# Patient Record
Sex: MALE | Race: WHITE | NOT HISPANIC OR LATINO | Employment: OTHER | URBAN - METROPOLITAN AREA
[De-identification: names, ages, dates, MRNs, and addresses within clinical notes are randomized per-mention and may not be internally consistent; named-entity substitution may affect disease eponyms.]

---

## 2017-03-24 ENCOUNTER — ALLSCRIPTS OFFICE VISIT (OUTPATIENT)
Dept: OTHER | Facility: OTHER | Age: 42
End: 2017-03-24

## 2017-03-24 ENCOUNTER — APPOINTMENT (OUTPATIENT)
Dept: LAB | Facility: HOSPITAL | Age: 42
End: 2017-03-24
Payer: COMMERCIAL

## 2017-03-24 DIAGNOSIS — R21 RASH AND OTHER NONSPECIFIC SKIN ERUPTION: ICD-10-CM

## 2017-03-24 PROCEDURE — 87186 SC STD MICRODIL/AGAR DIL: CPT

## 2017-03-24 PROCEDURE — 87070 CULTURE OTHR SPECIMN AEROBIC: CPT

## 2017-03-24 PROCEDURE — 87255 GENET VIRUS ISOLATE HSV: CPT

## 2017-03-24 PROCEDURE — 87147 CULTURE TYPE IMMUNOLOGIC: CPT

## 2017-03-27 LAB
BACTERIA NOSE AEROBE CULT: ABNORMAL
BACTERIA NOSE AEROBE CULT: ABNORMAL

## 2017-03-30 LAB — HSV SPEC CULT: NORMAL

## 2017-05-15 ENCOUNTER — ALLSCRIPTS OFFICE VISIT (OUTPATIENT)
Dept: OTHER | Facility: OTHER | Age: 42
End: 2017-05-15

## 2017-08-16 ENCOUNTER — ALLSCRIPTS OFFICE VISIT (OUTPATIENT)
Dept: OTHER | Facility: OTHER | Age: 42
End: 2017-08-16

## 2018-01-12 VITALS
SYSTOLIC BLOOD PRESSURE: 136 MMHG | WEIGHT: 177.5 LBS | BODY MASS INDEX: 26.9 KG/M2 | HEART RATE: 74 BPM | DIASTOLIC BLOOD PRESSURE: 74 MMHG | HEIGHT: 68 IN

## 2018-01-14 VITALS
RESPIRATION RATE: 14 BRPM | BODY MASS INDEX: 26.99 KG/M2 | HEIGHT: 69 IN | WEIGHT: 182.25 LBS | SYSTOLIC BLOOD PRESSURE: 130 MMHG | DIASTOLIC BLOOD PRESSURE: 80 MMHG | HEART RATE: 68 BPM

## 2018-01-14 VITALS
HEIGHT: 69 IN | DIASTOLIC BLOOD PRESSURE: 76 MMHG | SYSTOLIC BLOOD PRESSURE: 128 MMHG | BODY MASS INDEX: 26.22 KG/M2 | WEIGHT: 177 LBS | HEART RATE: 68 BPM | RESPIRATION RATE: 14 BRPM

## 2018-07-23 ENCOUNTER — HOSPITAL ENCOUNTER (EMERGENCY)
Facility: HOSPITAL | Age: 43
Discharge: HOME | End: 2018-07-23
Attending: EMERGENCY MEDICINE
Payer: COMMERCIAL

## 2018-07-23 VITALS
DIASTOLIC BLOOD PRESSURE: 73 MMHG | RESPIRATION RATE: 18 BRPM | SYSTOLIC BLOOD PRESSURE: 106 MMHG | HEART RATE: 86 BPM | TEMPERATURE: 98.2 F | OXYGEN SATURATION: 98 %
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
## 2018-07-23 DIAGNOSIS — S60.511A: Primary | ICD-10-CM

## 2018-07-23 PROCEDURE — 99283 EMERGENCY DEPT VISIT LOW MDM: CPT

## 2018-07-23 RX ORDER — IBUPROFEN 400 MG/1
400 TABLET ORAL ONCE
Status: COMPLETED | OUTPATIENT
Start: 2018-07-23 | End: 2018-07-23

## 2018-07-23 RX ORDER — AMOXICILLIN AND CLAVULANATE POTASSIUM 875; 125 MG/1; MG/1
1 TABLET, FILM COATED ORAL ONCE
Status: COMPLETED | OUTPATIENT
Start: 2018-07-23 | End: 2018-07-23

## 2018-07-23 RX ORDER — BACITRACIN, NEOMYCIN, POLYMYXIN B 400; 3.5; 5 [USP'U]/G; MG/G; [USP'U]/G
1 OINTMENT TOPICAL ONCE
Status: COMPLETED | OUTPATIENT
Start: 2018-07-23 | End: 2018-07-23

## 2018-07-23 RX ORDER — AMOXICILLIN AND CLAVULANATE POTASSIUM 875; 125 MG/1; MG/1
1 TABLET, FILM COATED ORAL EVERY 12 HOURS
Qty: 14 TABLET | Refills: 0 | Status: SHIPPED | OUTPATIENT
Start: 2018-07-23 | End: 2018-07-30

## 2018-07-23 RX ADMIN — AMOXICILLIN AND CLAVULANATE POTASSIUM 1 TABLET: 875; 125 TABLET, FILM COATED ORAL at 12:44

## 2018-07-23 RX ADMIN — IBUPROFEN 400 MG: 400 TABLET ORAL at 12:44

## 2018-07-23 RX ADMIN — BACITRACIN, NEOMYCIN, POLYMYXIN B 1 SMALL APPLICATION: 400; 3.5; 5 OINTMENT TOPICAL at 12:45

## 2018-07-23 NOTE — DISCHARGE INSTRUCTIONS
Take Augmentin as prescribed to prevent infection in the hand  Cleanse the deep abrasion / scratch once to twice daily with mild soap and water  Apply an over-the-counter antibacterial ointment such as Neosporin, triple antibiotic or bacitracin and cover with bandage to prevent contamination with debris  Re-seek medical attention if you developed redness outside of the area, swelling or any other worsening  Animal Bite   WHAT YOU NEED TO KNOW:   Animal bite injuries range from shallow cuts to deep, life-threatening wounds  An animal can cut or puncture the skin when it bites  Your skin may be torn from your body  Your skin may swell or bruise even if the bite does not break the skin  Animal bites occur more often on the hands, arms, legs, and face  Bites from dogs and cats are the most common injuries  DISCHARGE INSTRUCTIONS:   Seek care immediately if:   · You have a fever  · Your wound is red, swollen, and draining pus  · You see red streaks on the skin around the wound  · You can no longer move the bitten area  · Your heartbeat and breathing are much faster than usual     · You feel dizzy and confused  Contact your healthcare provider if:   · Your pain does not get better, even after you take pain medicine  · You have nightmares or flashbacks about the animal bite  · You have questions or concerns about your condition or care  Medicines: You may need any of the following:  · Antibiotics  prevent or treat a bacterial infection  · Prescription pain medicine  may be given  Ask how to take this medicine safely  · A tetanus vaccine  may be needed to prevent tetanus  Tetanus is a life-threatening bacterial infection that affects the nerves and muscles  The bacteria can be spread through animal bites  · A rabies vaccine  may be needed to prevent rabies  Rabies is a life-threatening viral infection  The virus can be spread through animal bites      · Take your medicine as directed  Contact your healthcare provider if you think your medicine is not helping or if you have side effects  Tell him or her if you are allergic to any medicine  Keep a list of the medicines, vitamins, and herbs you take  Include the amounts, and when and why you take them  Bring the list or the pill bottles to follow-up visits  Carry your medicine list with you in case of an emergency  Follow up with your healthcare provider in 1 to 2 days: You may need to return to have your stitches removed  Write down your questions so you remember to ask them during your visits  Self-care:   · Apply antibiotic ointment as directed  This helps prevent infection in minor skin wounds  It is available without a doctor's order  · Keep the wound clean and covered  Wash the wound every day with soap and water or germ-killing cleanser  Ask your healthcare provider about the kinds of bandages to use  · Apply ice on your wound  Ice helps decrease swelling and pain  Ice may also help prevent tissue damage  Use an ice pack, or put crushed ice in a plastic bag  Cover it with a towel and place it on your wound for 15 to 20 minutes every hour or as directed  · Elevate the wound area  Raise your wound above the level of your heart as often as you can  This will help decrease swelling and pain  Prop your wound on pillows or blankets to keep it elevated comfortably  Prevent another animal bite:   · Learn to recognize the signs of a scared or angry pet  Avoid quick, sudden movements  · Do not step between animals that are fighting  · Do not leave a pet alone with a young child  · Do not disturb an animal while it eats, sleeps, or cares for its young  · Do not approach an animal you do not know, especially one that is tied up or caged  · Stay away from animals that seem sick or act strangely  · Do not feed or capture wild animals    © 2017 [employer] Information is for End User's use only and may not be sold, redistributed or otherwise used for commercial purposes  All illustrations and images included in CareNotes® are the copyrighted property of [employer]  or Christian Hammond  The above information is an  only  It is not intended as medical advice for individual conditions or treatments  Talk to your doctor, nurse or pharmacist before following any medical regimen to see if it is safe and effective for you  Cat Scratch Disease   WHAT YOU NEED TO KNOW:   Cat-scratch disease (CSD) is an infection caused by bacteria in a cat's mouth  You can get CSD by being scratched, licked, or bitten by an infected cat  The germs usually spread after the cat licks its paws then scratches or bites human skin  DISCHARGE INSTRUCTIONS:   Seek care immediately if:   · You have severe pain in your stomach, muscles, bones, or joints  · You have severe pain in the lymph nodes in your neck, armpit, or groin  · You have seizures, headaches, or cannot think clearly  Contact your healthcare provider if:   · You have a fever, sore throat, or headache  · You notice swelling in your neck, armpit, or groin  · You have stomach, muscle, or joint pain  · You have a skin rash, itching, or swelling after you take your medicine  · You have questions or concerns about your condition or care  Medicines:   · Medicines  may be given to help treat a bacterial infection or decrease pain, fever, and swelling  · Take your medicine as directed  Contact your healthcare provider if you think your medicine is not helping or if you have side effects  Tell him or her if you are allergic to any medicine  Keep a list of the medicines, vitamins, and herbs you take  Include the amounts, and when and why you take them  Bring the list or the pill bottles to follow-up visits  Carry your medicine list with you in case of an emergency    Follow up with your healthcare provider as directed:  Write down your questions so you remember to ask them during your visits  Prevent CSD:   · Wash your hands after you handle or pet a cat  · Have your cat treated for fleas  Aggie Song can spread the germ from cat to cat  · Do not allow your cat to lick an open wound on your skin  · Take care when you play with cats to avoid bites or scratches  Avoid rough play  · If you get scratched, licked, or bitten by a cat, wash the affected area with clean water and soap right away  © 2017 [facility] Information is for End User's use only and may not be sold, redistributed or otherwise used for commercial purposes  All illustrations and images included in CareNotes® are the copyrighted property of [employer]  or Christian Hammond  The above information is an  only  It is not intended as medical advice for individual conditions or treatments  Talk to your doctor, nurse or pharmacist before following any medical regimen to see if it is safe and effective for you

## 2018-07-26 NOTE — ED PROVIDER NOTES
History  Chief Complaint   Patient presents with    Cat Scratch     Pt got scratched by his neighbors cat  UTD  on shots  81-year-old right-hand-dominant male, healthy at baseline, presents to the emergency department for evaluation right hand  Just prior to arrival he was scratched by a cat he was caring for  He expresses concern over the depth the scratch  The cat was in for a routine visit and is up-to-date with vaccines  Patient up-to-date on tetanus vaccine  None       Past Medical History:   Diagnosis Date    History of exposure to HIV     resolved 6/5/15       No past surgical history on file  No family history on file  I have reviewed and agree with the history as documented  Social History   Substance Use Topics    Smoking status: Current Some Day Smoker    Smokeless tobacco: Never Used    Alcohol use Yes        Review of Systems   All other systems reviewed and are negative  Physical Exam  Physical Exam   Constitutional: He appears well-developed and well-nourished  Cardiovascular: Normal rate  Pulmonary/Chest: Effort normal  No respiratory distress  Musculoskeletal: Normal range of motion  Patient with deep abrasion over the dorsum of the right thumb  The proximal 1 5 cm portion of this abrasion is deeper than the distal 2 cm  The proximal portion is approximately 2 to 3 mm deep  Subcutaneous tissue was visualized  Wound appears clean  No debris appreciated  There is no surrounding erythema or swelling  No active bleeding  Patient with full range of motion in the thumb  Sensation intact  Neurological: He is alert  Nursing note and vitals reviewed        Vital Signs  ED Triage Vitals [07/23/18 1209]   Temperature Pulse Respirations Blood Pressure SpO2   98 2 °F (36 8 °C) 86 18 106/73 98 %      Temp Source Heart Rate Source Patient Position - Orthostatic VS BP Location FiO2 (%)   Oral Monitor Sitting Left arm --      Pain Score       No Pain Vitals:    07/23/18 1209   BP: 106/73   Pulse: 86   Patient Position - Orthostatic VS: Sitting       Visual Acuity      ED Medications  Medications   ibuprofen (MOTRIN) tablet 400 mg (400 mg Oral Given 7/23/18 1244)   amoxicillin-clavulanate (AUGMENTIN) 875-125 mg per tablet 1 tablet (1 tablet Oral Given 7/23/18 1244)   neomycin-bacitracin-polymyxin b (NEOSPORIN) ointment 1 small application (1 small application Topical Given 7/23/18 1245)       Diagnostic Studies  Results Reviewed     None                 No orders to display              Procedures  Procedures       Phone Contacts  ED Phone Contact    ED Course                               MDM  Number of Diagnoses or Management Options  Cat scratch of right hand:   Diagnosis management comments: Patient with cat scratch to the dominant hand  No current signs of infection  A oral antibiotics will be initiated for prophylaxis  CT was up-to-date on vaccines and patient up-to-date on tetanus vaccine  No vaccinations needed  As reviewed with patient would consider using tissue adhesive on a similar wound caused by alternate etiology/with clean surface  Given her it nature cat scratches do not wish to close wound  The wound edges do approximate well and I do not believe that suturing (even loosely) would provide a benefit to healing  Patient demonstrated understanding  CritCare Time    Disposition  Final diagnoses:   Cat scratch of right hand     Time reflects when diagnosis was documented in both MDM as applicable and the Disposition within this note     Time User Action Codes Description Comment    7/23/2018 12:40 PM Dc Dunham Add [P20 824Z,  W55 03XA] Cat scratch of right hand       ED Disposition     ED Disposition Condition Comment    Discharge  Naomie Ortegasilvia discharge to home/self care      Condition at discharge: Good        Follow-up Information     Follow up With Specialties Details Why Contact Info    Hayde Warner MD Internal Medicine Schedule an appointment as soon as possible for a visit As needed, If symptoms worsen [address]  [address] [facility], [address] [place] [address] [address] [place] [address]  [phone]            Discharge Medication List as of 7/23/2018 12:48 PM      START taking these medications    Details   amoxicillin-clavulanate (AUGMENTIN) 875-125 mg per tablet Take 1 tablet by mouth every 12 (twelve) hours for 7 days, Starting Mon 7/23/2018, Until Mon 7/30/2018, Print           No discharge procedures on file      ED Provider  Electronically Signed by           Christophe Vazquez MD  07/26/18 2446

## 2022-01-05 ENCOUNTER — APPOINTMENT (OUTPATIENT)
Dept: RADIOLOGY | Age: 47
End: 2022-01-05
Payer: COMMERCIAL

## 2022-01-05 ENCOUNTER — OFFICE VISIT (OUTPATIENT)
Dept: URGENT CARE | Age: 47
End: 2022-01-05
Payer: COMMERCIAL

## 2022-01-05 VITALS
HEART RATE: 93 BPM | DIASTOLIC BLOOD PRESSURE: 66 MMHG | SYSTOLIC BLOOD PRESSURE: 128 MMHG | RESPIRATION RATE: 20 BRPM | TEMPERATURE: 98.1 F

## 2022-01-05 DIAGNOSIS — T14.90XA INJURY: ICD-10-CM

## 2022-01-05 DIAGNOSIS — S99.911A INJURY OF RIGHT ANKLE, INITIAL ENCOUNTER: Primary | ICD-10-CM

## 2022-01-05 PROCEDURE — S9083 URGENT CARE CENTER GLOBAL: HCPCS | Performed by: PHYSICIAN ASSISTANT

## 2022-01-05 PROCEDURE — G0382 LEV 3 HOSP TYPE B ED VISIT: HCPCS | Performed by: PHYSICIAN ASSISTANT

## 2022-01-05 PROCEDURE — 73610 X-RAY EXAM OF ANKLE: CPT

## 2022-01-05 PROCEDURE — 73630 X-RAY EXAM OF FOOT: CPT

## 2022-01-05 NOTE — PROGRESS NOTES
[facility]        NAME: Deann Segura is a 40 y o  male  : 1975    MRN: [id number]  DATE: 2022  TIME: 4:20 PM    Assessment and Plan   Injury of right ankle, initial encounter [S99 911A]  1  Injury of right ankle, initial encounter  XR foot 3+ vw right    XR ankle 3+ vw right    Ambulatory referral to Orthopedic Surgery     Xray- negative for obvious acute osseous abnormality, pending final read   Walking boot and cane- placed by medical staff for comfort, NV intact post-splinting    Patient Instructions     RICE- rest, ice, compression, elevation  Walking boot and cane for comfort  Call tomorrow for official xray results  Call Ortho today and follow-up with them in the next 1-2 days for further evaluation and treatment  Call Celestino Hernandez to schedule an appointment: [phone]  Or the direct Ortho number at [phone] to schedule an appointment   Go to the ER immediately if any numbness, tingling, weakness, change in intensity or location of pain, calf pain or swelling, other new or concerning symptoms    Chief Complaint     Chief Complaint   Patient presents with    Ankle Injury     pt states rolled right ankle yesterday while walking         History of Present Illness       52-year-old male presents for evaluation of right lateral ankle pain and foot pain after injury that occurred yesterday  States he was walking and accidentally rolled his ankle/foot  Denies hitting his head or losing consciousness  States he has tried elevation with some improvement  States he noticed ankle and foot are swollen and have some bruising  States pain is worse with walking or palpation, feels better at rest   He denies any calf or shin pain or swelling  Denies any redness, warmth, abrasions/ulcerations/lacerations  Denies any numbness, tingling, weakness or other complaints  Review of Systems   Review of Systems   Constitutional: Negative for fatigue and fever  Respiratory: Negative for shortness of breath  Cardiovascular: Negative for chest pain and leg swelling  Gastrointestinal: Negative for abdominal pain, nausea and vomiting  Musculoskeletal: Positive for arthralgias and joint swelling  Neurological: Negative for weakness, numbness and headaches  All other systems reviewed and are negative  Current Medications     No current outpatient medications on file  Current Allergies     Allergies as of 01/05/2022    (No Known Allergies)            The following portions of the patient's history were reviewed and updated as appropriate: allergies, current medications, past family history, past medical history, past social history, past surgical history and problem list      Past Medical History:   Diagnosis Date    History of exposure to HIV     resolved 6/5/15       History reviewed  No pertinent surgical history  History reviewed  No pertinent family history  Medications have been verified  Objective   /66   Pulse 93   Temp 98 1 °F (36 7 °C)   Resp 20        Physical Exam     Physical Exam  Vitals and nursing note reviewed  Constitutional:       General: He is not in acute distress  Appearance: He is well-developed  Cardiovascular:      Rate and Rhythm: Normal rate and regular rhythm  Heart sounds: Normal heart sounds  Pulmonary:      Effort: Pulmonary effort is normal       Breath sounds: Normal breath sounds  No wheezing  Musculoskeletal:      Cervical back: Normal range of motion and neck supple  Right lower leg: Normal  No swelling or tenderness  No edema  Right ankle: Swelling and ecchymosis present  No deformity  Normal range of motion (But mild pain with varus/valgus stress)  Anterior drawer test negative  Normal pulse  Right foot: Normal range of motion and normal capillary refill  Swelling and tenderness present  No laceration  Normal pulse          Feet:       Comments: Able to wiggle toes, dorsiflexion/plantar flexion intact  No shin or calf swelling or tenderness to palpation  Skin:     Capillary Refill: Capillary refill takes less than 2 seconds  Findings: No erythema  Neurological:      Mental Status: He is alert and oriented to person, place, and time  Deep Tendon Reflexes: Reflexes are normal and symmetric  Comments: NV intact with sensation and strong peripheral pulses   5/5 strength of LE bilaterally   Psychiatric:         Behavior: Behavior normal

## 2022-01-05 NOTE — PATIENT INSTRUCTIONS
RICE- rest, ice, compression, elevation  Walking boot and cane for comfort  Call tomorrow for official xray results  Call Ortho today and follow-up with them in the next 1-2 days for further evaluation and treatment     Call Celestino Hernandez to schedule an appointment: [phone]  Or the direct Ortho number at [phone] to schedule an appointment   Go to the ER immediately if any numbness, tingling, weakness, change in intensity or location of pain, calf pain or swelling, other new or concerning symptoms

## 2022-01-19 NOTE — PROGRESS NOTES
1  Injury of right ankle, initial encounter  CT lower extremity wo contrast right     Orders Placed This Encounter   Procedures    CT lower extremity wo contrast right        Imaging Studies (I personally reviewed images in PACS and report):  X-ray of right ankle 01/05/2022: No acute osseous abnormality  X-ray of right foot 01/05/2022: No acute osseous abnormality      IMPRESSION:  Right ankle sprain   Date of Injury:  01/03/2022   Follow-up from injury:  17 days    Repeat X-ray next visit: None    Return if symptoms worsen or fail to improve  Patient Instructions     Explained the patient that there is lucency in the posterior aspect of his talus concerning for a miller's fracture  As such, I ordered CT scan of the right ankle for further evaluation  In the interim, I recommended continue wearing controlled ankle motion boot for support and to help avoid any displacement of fracture fragment  His CT scan confirms no fracture fragment and patient may begin therapy for ankle sprain as below  Ankle sprains are tears of ligaments in your ankle  Ligaments are fibrous tissues that hold bones together like stitches  Some ligaments such as the ACL in the knee do not heal on their own; however, the ligaments involved in ankle sprain usually do heal without issue over 4-6 weeks  Some people even have relief for more minor sprains within 1-2 weeks  Initial treatment includes PRICE treatment including Protection with ankle splint, Rest with range of motion exercises to prevent stiffness, Ice for 20 minutes every 2-3 hours for the first 2 days or until swelling plateaus, and Elevation to keep the foot and ankle 6-10 inches above your heart when lying down to allow for drainage of fluid from your ankle  Prolonged rest within a few days including immobilization of your ankle in braces, crutches, or Cam boot can result in severe stiffness and worsening of symptoms   As such, please start daily range-of-motion exercises moving your ankle in circles and drawing the alphabet using year big toe as if it were a pencil in the air (AFP Revonda Lipa 2001)  Physical therapy is also key to helping speed up the healing process as well as for preventing future sprain  Once sprained you are at risk for sprain again for up to 1 year after injury  Physical therapy including home exercises for 8-12 weeks has been shown to be preventative of future sprains (B&K 4th)  You may attempt return to running when walking is no longer painful  I recommended gradual return to running to include a few days of 50% walking/50% jogging 1 mile, followed by 50% jogging/50% running 1 mile, followed by 100% running 1 mile if pain free  I recommend increasing mileage at a slow pace thereafter (AFP Revonda Lipa 2001)  I also recommend wearing lace-up ankle brace when playing sports for up to 1 year to prevent the rate of recurrence but not severity of recurrent ankle sprains  (Arch Orthop Trauma Surg  2013 Aug; 133 (5): 7621-3483)  If you have persistent symptoms at 6 weeks then we will consider an MRI of your ankle to evaluate for other injuries that can be hidden such as an osteochondral fracture of the talus bone (B&K4th)  Ankle Exercises   AMBULATORY CARE:   What you need to know about ankle exercises: Ankle exercises help strengthen your ankle and improve its function after injury  These are beginning exercises  Ask your healthcare provider if you need to see a physical therapist for more advanced exercises  · Do these exercises 3 to 5 days a week , or as directed by your healthcare provider  Ask if you should perform the exercises on each ankle  · Do the exercises in the order that your healthcare provider recommends  This will help prevent swelling, chronic pain, and reinjury  Start with range of motion exercises  Then progress to strengthening exercises, and finally to balancing exercises  · Warm up before you do ankle exercises  Walk or ride a stationary bike for 5 to 10 minutes to prepare your ankle for movement  · Stop if you feel pain  It is normal to feel some discomfort at first  Regular exercise will help decrease your discomfort over time  How to perform range of motion exercises safely:  Begin with range of motion exercises to improve flexibility  Ask your healthcare provider when you can progress to strengthening exercises  · Ankle alphabet:  Sit on a chair so that your feet do not touch the floor  Use your big toe to write each letter of the alphabet  Use only your foot and ankle, and keep your movements small  Do 2 sets  · Calf stretches:      ? Sitting calf stretches with a towel:  Sit on the floor with both legs out straight in front of you  Loop a towel around the ball of your injured foot  Grasp the ends of the towel and pull it toward you  Keep your leg and back straight  Do not lean forward as you pull the towel  Hold for 30 seconds  Then relax for 30 seconds  Do 2 sets of 10          ? Standing calf stretches:  Stand facing a wall with the foot that is not injured forward and your knee slightly bent  Keep the leg with the injured foot straight and behind you with your toes pointed in slightly  With both heels flat on the floor, press your hips forward  Do not arch your back  Hold for 30 seconds, and then relax for 30 seconds  Do 2 sets of 10  Repeat with your leg bent  Do 2 sets of 10  How to perform strengthening exercises safely:  After you can perform range of motion exercises without pain, you may begin strengthening exercises  Ask your healthcare provider when you can progress to balancing exercises  · Ankle movement in 4 directions:  Sit on the floor with your legs straight in front of you  Keep your heels on the floor for support  ? Dorsiflexion:  Begin with your toes pointing straight up  Pull your toes toward your body  Slowly return to the starting position   Do 3 sets of 5      ? Plantar flexion:  Begin with your toes pointing straight up  Push your toes away from your body  Slowly return to the starting position  Do 3 sets of 5          ? Inversion:  Begin with your toes pointing straight up  Push your toes inward, toward each other  Slowly return to the starting position  Do 3 sets of 5      ? Eversion:  Begin with your toes pointing straight up  Push your toes outward, away from each other  Slowly return to the starting position  Do 3 sets of 5        · Toe curls with a towel:  Sit on a chair so that both of your feet are flat on the floor  Place a small towel on the floor in front of your injured foot  Grab the center of the towel with your toes and curl the towel toward you  Relax and repeat  Do 1 set of 5          · [place] pick-ups:  Sit on a chair so that both of your feet are flat on the floor  Place 20 marbles on the floor in front of your injured foot  Use your toes to  one marble at a time and place it into a bowl  Repeat until you have picked up all the marbles  Do 1 set  · Heel raises:      ? Single leg heel raises:  Stand with your weight evenly on both feet  Hold on to a chair or a wall for balance  Lift the foot that is not injured off the floor so all your weight is placed on your injured foot  Raise the heel of your injured foot as high as you can  Slowly lower your heel to the floor  Do 1 set of 10          ? Double leg heel raises:  Stand with your weight evenly on both feet  Hold on to a chair or a wall for balance  Raise both of your heels as high as you can  Slowly lower your heels to the floor  Do 1 set of 10  · Heel and toe walks:      ? Heel walks:  Begin in a standing position  Lift your toes off the floor and walk on your heels  Keep your toes lifted as high as possible  Do 2 sets of 10          ? Toe walks:  Begin in a standing position  Lift your heels off the floor and walk on the balls and toes of your feet  Keep your heels lifted as high as possible  Do 2 sets of 10  How to perform a balance exercise safely:  After you can perform strengthening exercises without pain, you may do this beginning balancing exercise  Ask your healthcare provider for more advanced balance exercises  · Single leg stance:  Stand with your weight evenly on both feet, or hold on to a chair or a wall  Do not lean to the side  Lift the foot that is not injured off the floor so all your weight is placed on your injured foot  Balance on your injured foot  Ask your healthcare provider how long to hold this position  Contact your healthcare provider if:   · Your pain becomes worse  · You have new pain  · You have questions or concerns about your condition, care, or exercise program     © Copyright [employer] 2021 Information is for End User's use only and may not be sold, redistributed or otherwise used for commercial purposes  All illustrations and images included in CareNotes® are the copyrighted property of A D A M , Inc  or [employer]   The above information is an  only  It is not intended as medical advice for individual conditions or treatments  Talk to your doctor, nurse or pharmacist before following any medical regimen to see if it is safe and effective for you  CHIEF COMPLAINT:  Right ankle pain    HPI:  Pamela Alvarez is a 40 y o  male  who presents for       Visit 1/20/2022 :  Patient presents for evaluation of right ankle pain  He was referred by Linda Real PA-C  He had an injury which occurred on 01/04/2022 when he accidentally rolled his ankle inward  He had x-rays of his ankle performed which did not show any acute osseous abnormality, pre she was placed in a cam boot and crutches  Was advised to follow-up with sports medicine  Today, patient points to the lateral and posterior aspect of his ankle as source of pain  Throbbing stabbing worse with climbing stairs mild intensity 2/10        Review of Systems Constitutional: Negative for chills and fever  HENT: Negative for ear pain and sore throat  Eyes: Negative for pain and visual disturbance  Respiratory: Negative for cough and shortness of breath  Cardiovascular: Negative for chest pain and palpitations  Gastrointestinal: Negative for abdominal pain and vomiting  Genitourinary: Negative for dysuria and hematuria  Musculoskeletal: Negative for arthralgias and back pain  Skin: Negative for color change and rash  Neurological: Negative for seizures and syncope  All other systems reviewed and are negative  Following history reviewed and update:    Past Medical History:   Diagnosis Date    History of exposure to HIV     resolved 6/5/15     History reviewed  No pertinent surgical history  Social History   Social History     Substance and Sexual Activity   Alcohol Use Yes     Social History     Substance and Sexual Activity   Drug Use No     Social History     Tobacco Use   Smoking Status Current Some Day Smoker   Smokeless Tobacco Never Used     No family history on file  No Known Allergies       Physical Exam  /72 (BP Location: Left arm, Patient Position: Sitting, Cuff Size: Adult)   Pulse 76   Ht 5' 8" (1 727 m)   Wt 85 3 kg (188 lb)   BMI 28 59 kg/m²     Constitutional:  see vital signs  Gen: well-developed, normocephalic/atraumatic, well-groomed  Eyes: No inflammation or discharge of conjunctiva or lids; sclera clear   Pharynx: no inflammation, lesion, or mass of lips  Neck: supple, no masses, non-distended  MSK: no inflammation, lesion, mass, or clubbing of nails and digits except for other than mentioned below  SKIN: no visible rashes or skin lesions  Pulmonary/Chest: Effort normal  No respiratory distress     NEURO: cranial nerves grossly intact  PSYCH:  Alert and oriented to person, place, and time; recent and remote memory intact; mood normal, no depression, anxiety, or agitation, judgment and insight good and intact Ortho Exam    RIGHT ANKLE  EXAM  +  Right antalgic gait    Inspection  Erythema: no  Ecchymosis: no  Edema: + mild-to-moderate    Tenderness  Proximal Fibula: no  (Maisonneuve frx)  AiTFL: no  (2cm proximal-medial to tip lateral malleolus 92% sens, 29% spec)  ATFL: +  CFL: no  PTFL: no  Achilles:  no  Deltoid: No  Peroneal: no  Tibialis Anterior: no  Tibialis Posterior: no    Bony Tenderpoints:  Lateral Malleolus: no  Base of 5th MT: no  Medial Malleolus: no  Navicular: no  Talar dome: No    ROM  Dorsiflexion: intact  Plantarflexion: intact    Muscle Strength  Pronation: intact without pain  Supination: intact without pain    Tib-Fib Squeeze: negative  (aymjowsfjmuv-qk-dblldlwnicpcsc squeeze; 26% sens, 88% spec; rule in test)    Calcaneal Squeeze: negative    Dorsiflexion (+) ER Stress Test: negative  (reproduce ATiFL mech; 71% sens, 63% spec)     RIGHT ACHILLES EXAMINATION:  Simmonds Triad:  Palpable Gap or Defect of Achilles: none  Angle of Declination: angle of baseline plantarflexion symmetric to contralateral side  Matles Test (patient prone, intact and symmetric plantarflexion of ankle when flexing knee): intact  Angel's Calf Squeeze Test: intact obligatory plantarflexion            Procedures

## 2022-01-20 ENCOUNTER — OFFICE VISIT (OUTPATIENT)
Dept: OBGYN CLINIC | Facility: OTHER | Age: 47
End: 2022-01-20
Payer: COMMERCIAL

## 2022-01-20 VITALS
HEART RATE: 76 BPM | WEIGHT: 188 LBS | DIASTOLIC BLOOD PRESSURE: 72 MMHG | BODY MASS INDEX: 28.49 KG/M2 | HEIGHT: 68 IN | SYSTOLIC BLOOD PRESSURE: 105 MMHG

## 2022-01-20 DIAGNOSIS — S99.911A INJURY OF RIGHT ANKLE, INITIAL ENCOUNTER: Primary | ICD-10-CM

## 2022-01-20 PROCEDURE — 99204 OFFICE O/P NEW MOD 45 MIN: CPT | Performed by: FAMILY MEDICINE

## 2022-01-20 RX ORDER — CLONIDINE HYDROCHLORIDE 0.1 MG/1
TABLET, EXTENDED RELEASE ORAL
COMMUNITY
Start: 2014-08-01

## 2022-01-20 RX ORDER — BUPROPION HYDROCHLORIDE 150 MG/1
TABLET ORAL
COMMUNITY
Start: 2014-08-01

## 2022-01-20 NOTE — PATIENT INSTRUCTIONS
Explained the patient that there is lucency in the posterior aspect of his talus concerning for a miller's fracture  As such, I ordered CT scan of the right ankle for further evaluation  In the interim, I recommended continue wearing controlled ankle motion boot for support and to help avoid any displacement of fracture fragment  His CT scan confirms no fracture fragment and patient may begin therapy for ankle sprain as below  Ankle sprains are tears of ligaments in your ankle  Ligaments are fibrous tissues that hold bones together like stitches  Some ligaments such as the ACL in the knee do not heal on their own; however, the ligaments involved in ankle sprain usually do heal without issue over 4-6 weeks  Some people even have relief for more minor sprains within 1-2 weeks  Initial treatment includes PRICE treatment including Protection with ankle splint, Rest with range of motion exercises to prevent stiffness, Ice for 20 minutes every 2-3 hours for the first 2 days or until swelling plateaus, and Elevation to keep the foot and ankle 6-10 inches above your heart when lying down to allow for drainage of fluid from your ankle  Prolonged rest within a few days including immobilization of your ankle in braces, crutches, or Cam boot can result in severe stiffness and worsening of symptoms  As such, please start daily range-of-motion exercises moving your ankle in circles and drawing the alphabet using year big toe as if it were a pencil in the air ([facility] Arna Breeding 2001)  Physical therapy is also key to helping speed up the healing process as well as for preventing future sprain  Once sprained you are at risk for sprain again for up to 1 year after injury  Physical therapy including home exercises for 8-12 weeks has been shown to be preventative of future sprains (B&K 4th)  You may attempt return to running when walking is no longer painful   I recommended gradual return to running to include a few days of 50% walking/50% jogging 1 mile, followed by 50% jogging/50% running 1 mile, followed by 100% running 1 mile if pain free  I recommend increasing mileage at a slow pace thereafter (MARY ELLEN Lyle 2001)  I also recommend wearing lace-up ankle brace when playing sports for up to 1 year to prevent the rate of recurrence but not severity of recurrent ankle sprains  (Arch Orthop Trauma Surg  2013 Aug; 133 (8): 1317-1258)  If you have persistent symptoms at 6 weeks then we will consider an MRI of your ankle to evaluate for other injuries that can be hidden such as an osteochondral fracture of the talus bone (B&K4th)  Ankle Exercises   AMBULATORY CARE:   What you need to know about ankle exercises: Ankle exercises help strengthen your ankle and improve its function after injury  These are beginning exercises  Ask your healthcare provider if you need to see a physical therapist for more advanced exercises  · Do these exercises 3 to 5 days a week , or as directed by your healthcare provider  Ask if you should perform the exercises on each ankle  · Do the exercises in the order that your healthcare provider recommends  This will help prevent swelling, chronic pain, and reinjury  Start with range of motion exercises  Then progress to strengthening exercises, and finally to balancing exercises  · Warm up before you do ankle exercises  Walk or ride a stationary bike for 5 to 10 minutes to prepare your ankle for movement  · Stop if you feel pain  It is normal to feel some discomfort at first  Regular exercise will help decrease your discomfort over time  How to perform range of motion exercises safely:  Begin with range of motion exercises to improve flexibility  Ask your healthcare provider when you can progress to strengthening exercises  · Ankle alphabet:  Sit on a chair so that your feet do not touch the floor  Use your big toe to write each letter of the alphabet   Use only your foot and ankle, and keep your movements small  Do 2 sets  · Calf stretches:      ? Sitting calf stretches with a towel:  Sit on the floor with both legs out straight in front of you  Loop a towel around the ball of your injured foot  Grasp the ends of the towel and pull it toward you  Keep your leg and back straight  Do not lean forward as you pull the towel  Hold for 30 seconds  Then relax for 30 seconds  Do 2 sets of 10          ? Standing calf stretches:  Stand facing a wall with the foot that is not injured forward and your knee slightly bent  Keep the leg with the injured foot straight and behind you with your toes pointed in slightly  With both heels flat on the floor, press your hips forward  Do not arch your back  Hold for 30 seconds, and then relax for 30 seconds  Do 2 sets of 10  Repeat with your leg bent  Do 2 sets of 10  How to perform strengthening exercises safely:  After you can perform range of motion exercises without pain, you may begin strengthening exercises  Ask your healthcare provider when you can progress to balancing exercises  · Ankle movement in 4 directions:  Sit on the floor with your legs straight in front of you  Keep your heels on the floor for support  ? Dorsiflexion:  Begin with your toes pointing straight up  Pull your toes toward your body  Slowly return to the starting position  Do 3 sets of 5      ? Plantar flexion:  Begin with your toes pointing straight up  Push your toes away from your body  Slowly return to the starting position  Do 3 sets of 5          ? Inversion:  Begin with your toes pointing straight up  Push your toes inward, toward each other  Slowly return to the starting position  Do 3 sets of 5      ? Eversion:  Begin with your toes pointing straight up  Push your toes outward, away from each other  Slowly return to the starting position  Do 3 sets of 5        · Toe curls with a towel:  Sit on a chair so that both of your feet are flat on the floor   Place a small towel on the floor in front of your injured foot  Grab the center of the towel with your toes and curl the towel toward you  Relax and repeat  Do 1 set of 5          · [place] pick-ups:  Sit on a chair so that both of your feet are flat on the floor  Place 20 marbles on the floor in front of your injured foot  Use your toes to  one marble at a time and place it into a bowl  Repeat until you have picked up all the marbles  Do 1 set  · Heel raises:      ? Single leg heel raises:  Stand with your weight evenly on both feet  Hold on to a chair or a wall for balance  Lift the foot that is not injured off the floor so all your weight is placed on your injured foot  Raise the heel of your injured foot as high as you can  Slowly lower your heel to the floor  Do 1 set of 10          ? Double leg heel raises:  Stand with your weight evenly on both feet  Hold on to a chair or a wall for balance  Raise both of your heels as high as you can  Slowly lower your heels to the floor  Do 1 set of 10  · Heel and toe walks:      ? Heel walks:  Begin in a standing position  Lift your toes off the floor and walk on your heels  Keep your toes lifted as high as possible  Do 2 sets of 10          ? Toe walks:  Begin in a standing position  Lift your heels off the floor and walk on the balls and toes of your feet  Keep your heels lifted as high as possible  Do 2 sets of 10  How to perform a balance exercise safely:  After you can perform strengthening exercises without pain, you may do this beginning balancing exercise  Ask your healthcare provider for more advanced balance exercises  · Single leg stance:  Stand with your weight evenly on both feet, or hold on to a chair or a wall  Do not lean to the side  Lift the foot that is not injured off the floor so all your weight is placed on your injured foot  Balance on your injured foot  Ask your healthcare provider how long to hold this position             Contact your healthcare provider if:   · Your pain becomes worse  · You have new pain  · You have questions or concerns about your condition, care, or exercise program     © Copyright [employer] 2021 Information is for End User's use only and may not be sold, redistributed or otherwise used for commercial purposes  All illustrations and images included in CareNotes® are the copyrighted property of A D A M , Inc  or [facility] Ban Daugherty   The above information is an  only  It is not intended as medical advice for individual conditions or treatments  Talk to your doctor, nurse or pharmacist before following any medical regimen to see if it is safe and effective for you

## 2022-02-02 ENCOUNTER — HOSPITAL ENCOUNTER (OUTPATIENT)
Dept: RADIOLOGY | Age: 47
Discharge: HOME | End: 2022-02-02
Payer: COMMERCIAL

## 2022-02-02 DIAGNOSIS — S99.911A INJURY OF RIGHT ANKLE, INITIAL ENCOUNTER: ICD-10-CM

## 2022-02-02 PROCEDURE — G1004 CDSM NDSC: HCPCS

## 2022-02-02 PROCEDURE — 73700 CT LOWER EXTREMITY W/O DYE: CPT

## 2023-12-01 ENCOUNTER — RA CDI HCC (OUTPATIENT)
Dept: OTHER | Facility: HOSPITAL | Age: 48
End: 2023-12-01

## 2023-12-01 NOTE — PROGRESS NOTES
[address] [facility] coding opportunities       Chart reviewed, no opportunity found: CHART REVIEWED, NO OPPORTUNITY FOUND        Patients Insurance        Commercial Insurance: Jace Melo

## 2024-01-11 ENCOUNTER — OFFICE VISIT (OUTPATIENT)
Dept: INTERNAL MEDICINE CLINIC | Facility: CLINIC | Age: 49
End: 2024-01-11
Payer: COMMERCIAL

## 2024-01-11 VITALS
HEART RATE: 71 BPM | OXYGEN SATURATION: 97 % | WEIGHT: 203.4 LBS | TEMPERATURE: 96.3 F | BODY MASS INDEX: 30.13 KG/M2 | DIASTOLIC BLOOD PRESSURE: 74 MMHG | HEIGHT: 69 IN | SYSTOLIC BLOOD PRESSURE: 116 MMHG

## 2024-01-11 DIAGNOSIS — Z00.00 HEALTH MAINTENANCE EXAMINATION: Primary | ICD-10-CM

## 2024-01-11 DIAGNOSIS — E78.49 OTHER HYPERLIPIDEMIA: ICD-10-CM

## 2024-01-11 DIAGNOSIS — Z12.5 SCREENING FOR PROSTATE CANCER: ICD-10-CM

## 2024-01-11 DIAGNOSIS — L98.9 SKIN LESION: ICD-10-CM

## 2024-01-11 DIAGNOSIS — Z13.0 SCREENING FOR DEFICIENCY ANEMIA: ICD-10-CM

## 2024-01-11 DIAGNOSIS — E66.3 OVERWEIGHT: ICD-10-CM

## 2024-01-11 DIAGNOSIS — Z13.1 SCREENING FOR DIABETES MELLITUS: ICD-10-CM

## 2024-01-11 DIAGNOSIS — Z72.0 TOBACCO USE: ICD-10-CM

## 2024-01-11 DIAGNOSIS — R39.9 UTI SYMPTOMS: ICD-10-CM

## 2024-01-11 PROCEDURE — 99204 OFFICE O/P NEW MOD 45 MIN: CPT | Performed by: INTERNAL MEDICINE

## 2024-01-11 RX ORDER — PROPRANOLOL HCL 60 MG
CAPSULE, EXTENDED RELEASE 24HR ORAL
COMMUNITY
Start: 2023-12-06

## 2024-01-11 RX ORDER — HYDROXYZINE HYDROCHLORIDE 25 MG/1
TABLET, FILM COATED ORAL
COMMUNITY
Start: 2023-12-06

## 2024-01-11 RX ORDER — PROPRANOLOL HYDROCHLORIDE 20 MG/1
TABLET ORAL
COMMUNITY
Start: 2023-12-06 | End: 2024-01-11

## 2024-01-11 RX ORDER — BREXPIPRAZOLE 3 MG/1
TABLET ORAL
COMMUNITY
Start: 2023-12-08

## 2024-01-11 RX ORDER — BUPROPION HYDROCHLORIDE 200 MG/1
TABLET, EXTENDED RELEASE ORAL
COMMUNITY
Start: 2023-12-08

## 2024-01-11 NOTE — ASSESSMENT & PLAN NOTE
Overweight status reviewed recommend decreasing calorie consumption and initiation of regular exercise

## 2024-01-11 NOTE — ASSESSMENT & PLAN NOTE
Skin lesion noted on the left lateral nose under magnification seems to have some mild ulceration may be an early skin cancer referral to dermatology provided today.

## 2024-01-11 NOTE — PROGRESS NOTES
Name: Kevin Dorantes      : 1975      MRN: [id number]  Encounter Provider: Sachin Valle MD  Encounter Date: 2024   Encounter department: [facility] INTERNAL MEDICINE    Assessment & Plan     1. Health maintenance examination  Assessment & Plan:  General physical examination reviewed today recommend the patient work on regular exercise and healthy balanced diet.  Weight reduction is advised.  Patient advised to discontinued use of nonfiltered tobacco products he is currently smoking 5 Non filtered cigarettes a day.    Routine blood work has been requested and will be reviewed with the patient on follow-up visitation.      2. Skin lesion  Assessment & Plan:  Skin lesion noted on the left lateral nose under magnification seems to have some mild ulceration may be an early skin cancer referral to dermatology provided today.    Orders:  -     Ambulatory Referral to Dermatology; Future    3. Screening for deficiency anemia  -     CBC and differential; Future; Expected date: 2024    4. Screening for diabetes mellitus  -     Comprehensive metabolic panel; Future    5. Other hyperlipidemia  -     Lipid panel; Future; Expected date: 2024    6. Screening for prostate cancer  -     PSA, Total Screen; Future    7. UTI symptoms  -     Urinalysis with microscopic    8. Tobacco use  Assessment & Plan:  Patient counseled about tobacco use advised to discontinue current tobacco use      9. Overweight  Assessment & Plan:  Overweight status reviewed recommend decreasing calorie consumption and initiation of regular exercise             Subjective      This pleasant 48-year-old gentleman presents today to establish medical care with our practice.  He indicates that is been several years since his last physical checkup.    He presents with concerns regarding a skin growth on the left side of his nose has been present for some time now.    A review of the patient's past surgical history  "indicates hand surgery and fractures of several bones.  He has no history of any significant medical problems denying any history of diabetes hypertension kidney disease.  He is under the care of psychiatry services for management of anxiety and depression symptoms.    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  He does smoke 5 unfiltered cigarettes on a daily basis started smoking at the age of 14.  There have been times when his tobacco use has been heavier than current.  He is a retired marine.  He denies any alcohol consumption.  He admits to no regular significant exercise.  He is  and has 4 children.    During today's evaluation the patient 10 minutes was spent in preparation time reviewing available records 40 minutes was spent with the patient today with direct contact and 10 minutes was spent following the visit for note completion.      Review of Systems   Skin:         Skin lesion of the left nose   All other systems reviewed and are negative.      Current Outpatient Medications on File Prior to Visit   Medication Sig    buPROPion (WELLBUTRIN SR) 200 MG 12 hr tablet     hydrOXYzine HCL (ATARAX) 25 mg tablet     propranolol (INDERAL LA) 60 mg 24 hr capsule     Rexulti 3 MG tablet     [DISCONTINUED] propranolol (INDERAL) 20 mg tablet     [DISCONTINUED] buPROPion (WELLBUTRIN XL) 150 mg 24 hr tablet Take by mouth    [DISCONTINUED] cloNIDine HCl ER 0.1 MG TB12 Take by mouth (Patient not taking: Reported on 1/20/2022)       Objective     /74   Pulse 71   Temp (!) 96.3 °F (35.7 °C)   Ht 5' 9\" (1.753 m)   Wt 92.3 kg (203 lb 6.4 oz)   SpO2 97%   BMI 30.04 kg/m²     Physical Exam  Constitutional:       General: He is not in acute distress.     Appearance: Normal appearance. He is not ill-appearing.   HENT:      Head: Normocephalic.      Right Ear: Tympanic membrane, ear canal and external ear normal.      Left Ear: Tympanic membrane, ear canal and external ear normal.      " Nose: Nose normal.      Mouth/Throat:      Mouth: Mucous membranes are moist.      Pharynx: Oropharynx is clear.   Eyes:      Pupils: Pupils are equal, round, and reactive to light.   Neck:      Vascular: No carotid bruit.   Cardiovascular:      Rate and Rhythm: Regular rhythm.      Heart sounds: Normal heart sounds.   Pulmonary:      Effort: Pulmonary effort is normal. No respiratory distress.      Breath sounds: No wheezing, rhonchi or rales.   Abdominal:      General: Abdomen is flat. There is no distension.      Palpations: There is no mass.      Tenderness: There is no abdominal tenderness. There is no guarding.   Genitourinary:     Penis: Normal.       Testes: Normal.   Musculoskeletal:         General: No swelling or tenderness.      Cervical back: Normal range of motion and neck supple. No rigidity or tenderness.      Right lower leg: No edema.      Left lower leg: No edema.   Lymphadenopathy:      Cervical: No cervical adenopathy.   Skin:     Comments: Skin lesion of the left nose under magnification appears slightly ulcerated may be early skin cancer   Neurological:      General: No focal deficit present.      Mental Status: He is alert. Mental status is at baseline.   Psychiatric:         Mood and Affect: Mood normal.         Behavior: Behavior normal.         Thought Content: Thought content normal.         Judgment: Judgment normal.       Sachin Valle MD

## 2024-01-11 NOTE — ASSESSMENT & PLAN NOTE
General physical examination reviewed today recommend the patient work on regular exercise and healthy balanced diet.  Weight reduction is advised.  Patient advised to discontinued use of nonfiltered tobacco products he is currently smoking 5 Non filtered cigarettes a day.    Routine blood work has been requested and will be reviewed with the patient on follow-up visitation.

## 2024-02-07 ENCOUNTER — TELEPHONE (OUTPATIENT)
Dept: INTERNAL MEDICINE CLINIC | Facility: CLINIC | Age: 49
End: 2024-02-07

## 2024-02-07 DIAGNOSIS — Z12.5 SCREENING FOR PROSTATE CANCER: ICD-10-CM

## 2024-02-07 DIAGNOSIS — Z13.0 SCREENING FOR DEFICIENCY ANEMIA: Primary | ICD-10-CM

## 2024-02-07 DIAGNOSIS — Z13.1 SCREENING FOR DIABETES MELLITUS: ICD-10-CM

## 2024-02-07 DIAGNOSIS — R39.9 UTI SYMPTOMS: ICD-10-CM

## 2024-02-07 DIAGNOSIS — E78.49 OTHER HYPERLIPIDEMIA: ICD-10-CM

## 2024-02-09 ENCOUNTER — TELEPHONE (OUTPATIENT)
Dept: INTERNAL MEDICINE CLINIC | Facility: CLINIC | Age: 49
End: 2024-02-09

## 2024-02-09 DIAGNOSIS — E78.49 OTHER HYPERLIPIDEMIA: ICD-10-CM

## 2024-02-09 DIAGNOSIS — Z13.0 SCREENING FOR DEFICIENCY ANEMIA: Primary | ICD-10-CM

## 2024-02-09 DIAGNOSIS — R39.9 UTI SYMPTOMS: ICD-10-CM

## 2024-02-09 DIAGNOSIS — Z13.1 SCREENING FOR DIABETES MELLITUS: ICD-10-CM

## 2024-02-09 DIAGNOSIS — Z12.5 SCREENING FOR PROSTATE CANCER: ICD-10-CM

## 2024-02-09 NOTE — TELEPHONE ENCOUNTER
Pt called asking if you can change the dates on he's labs ,is stated in the sheet that its not due until may 11th ,Pt haves an appt on 02/14 and he will like to get the blood work done .      Thanks .

## 2024-02-12 ENCOUNTER — APPOINTMENT (OUTPATIENT)
Dept: LAB | Age: 49
End: 2024-02-12
Payer: COMMERCIAL

## 2024-02-12 LAB
ALBUMIN SERPL BCP-MCNC: 4.3 G/DL (ref 3.5–5)
ALP SERPL-CCNC: 71 U/L (ref 34–104)
ALT SERPL W P-5'-P-CCNC: 34 U/L (ref 7–52)
ANION GAP SERPL CALCULATED.3IONS-SCNC: 9 MMOL/L
AST SERPL W P-5'-P-CCNC: 19 U/L (ref 13–39)
BACTERIA UR QL AUTO: ABNORMAL /HPF
BASOPHILS # BLD AUTO: 0.08 THOUSANDS/ÂΜL (ref 0–0.1)
BASOPHILS NFR BLD AUTO: 1 % (ref 0–1)
BILIRUB SERPL-MCNC: 0.57 MG/DL (ref 0.2–1)
BILIRUB UR QL STRIP: NEGATIVE
BUN SERPL-MCNC: 16 MG/DL (ref 5–25)
CALCIUM SERPL-MCNC: 9.2 MG/DL (ref 8.4–10.2)
CHLORIDE SERPL-SCNC: 102 MMOL/L (ref 96–108)
CHOLEST SERPL-MCNC: 174 MG/DL
CLARITY UR: CLEAR
CO2 SERPL-SCNC: 26 MMOL/L (ref 21–32)
COLOR UR: YELLOW
CREAT SERPL-MCNC: 1 MG/DL (ref 0.6–1.3)
EOSINOPHIL # BLD AUTO: 0.34 THOUSAND/ÂΜL (ref 0–0.61)
EOSINOPHIL NFR BLD AUTO: 3 % (ref 0–6)
ERYTHROCYTE [DISTWIDTH] IN BLOOD BY AUTOMATED COUNT: 12.4 % (ref 11.6–15.1)
GFR SERPL CREATININE-BSD FRML MDRD: 89 ML/MIN/1.73SQ M
GLUCOSE P FAST SERPL-MCNC: 92 MG/DL (ref 65–99)
GLUCOSE UR STRIP-MCNC: NEGATIVE MG/DL
HCT VFR BLD AUTO: 43 % (ref 36.5–49.3)
HDLC SERPL-MCNC: 26 MG/DL
HGB BLD-MCNC: 14.5 G/DL (ref 12–17)
HGB UR QL STRIP.AUTO: ABNORMAL
IMM GRANULOCYTES # BLD AUTO: 0.06 THOUSAND/UL (ref 0–0.2)
IMM GRANULOCYTES NFR BLD AUTO: 1 % (ref 0–2)
KETONES UR STRIP-MCNC: NEGATIVE MG/DL
LDLC SERPL CALC-MCNC: 92 MG/DL (ref 0–100)
LEUKOCYTE ESTERASE UR QL STRIP: NEGATIVE
LYMPHOCYTES # BLD AUTO: 3.47 THOUSANDS/ÂΜL (ref 0.6–4.47)
LYMPHOCYTES NFR BLD AUTO: 31 % (ref 14–44)
MCH RBC QN AUTO: 30.8 PG (ref 26.8–34.3)
MCHC RBC AUTO-ENTMCNC: 33.7 G/DL (ref 31.4–37.4)
MCV RBC AUTO: 91 FL (ref 82–98)
MONOCYTES # BLD AUTO: 0.95 THOUSAND/ÂΜL (ref 0.17–1.22)
MONOCYTES NFR BLD AUTO: 8 % (ref 4–12)
MUCOUS THREADS UR QL AUTO: ABNORMAL
NEUTROPHILS # BLD AUTO: 6.44 THOUSANDS/ÂΜL (ref 1.85–7.62)
NEUTS SEG NFR BLD AUTO: 56 % (ref 43–75)
NITRITE UR QL STRIP: NEGATIVE
NON-SQ EPI CELLS URNS QL MICRO: ABNORMAL /HPF
NONHDLC SERPL-MCNC: 148 MG/DL
NRBC BLD AUTO-RTO: 0 /100 WBCS
PH UR STRIP.AUTO: 6 [PH]
PLATELET # BLD AUTO: 400 THOUSANDS/UL (ref 149–390)
PMV BLD AUTO: 9.6 FL (ref 8.9–12.7)
POTASSIUM SERPL-SCNC: 4 MMOL/L (ref 3.5–5.3)
PROT SERPL-MCNC: 6.9 G/DL (ref 6.4–8.4)
PROT UR STRIP-MCNC: ABNORMAL MG/DL
PSA SERPL-MCNC: 0.47 NG/ML (ref 0–4)
RBC # BLD AUTO: 4.71 MILLION/UL (ref 3.88–5.62)
RBC #/AREA URNS AUTO: ABNORMAL /HPF
SODIUM SERPL-SCNC: 137 MMOL/L (ref 135–147)
SP GR UR STRIP.AUTO: 1.02 (ref 1–1.03)
TRIGL SERPL-MCNC: 279 MG/DL
UROBILINOGEN UR STRIP-ACNC: <2 MG/DL
WBC # BLD AUTO: 11.34 THOUSAND/UL (ref 4.31–10.16)
WBC #/AREA URNS AUTO: ABNORMAL /HPF

## 2024-02-12 PROCEDURE — G0103 PSA SCREENING: HCPCS | Performed by: INTERNAL MEDICINE

## 2024-02-12 PROCEDURE — 36415 COLL VENOUS BLD VENIPUNCTURE: CPT | Performed by: INTERNAL MEDICINE

## 2024-02-12 PROCEDURE — 80053 COMPREHEN METABOLIC PANEL: CPT | Performed by: INTERNAL MEDICINE

## 2024-02-12 PROCEDURE — 85025 COMPLETE CBC W/AUTO DIFF WBC: CPT | Performed by: INTERNAL MEDICINE

## 2024-02-12 PROCEDURE — 80061 LIPID PANEL: CPT | Performed by: INTERNAL MEDICINE

## 2024-02-14 ENCOUNTER — OFFICE VISIT (OUTPATIENT)
Dept: INTERNAL MEDICINE CLINIC | Facility: CLINIC | Age: 49
End: 2024-02-14
Payer: COMMERCIAL

## 2024-02-14 VITALS
TEMPERATURE: 96.4 F | WEIGHT: 196 LBS | HEART RATE: 76 BPM | OXYGEN SATURATION: 98 % | BODY MASS INDEX: 29.03 KG/M2 | HEIGHT: 69 IN

## 2024-02-14 DIAGNOSIS — Z72.0 TOBACCO USE: ICD-10-CM

## 2024-02-14 DIAGNOSIS — Z12.5 SCREENING FOR PROSTATE CANCER: ICD-10-CM

## 2024-02-14 DIAGNOSIS — R80.0 ISOLATED PROTEINURIA WITHOUT SPECIFIC MORPHOLOGIC LESION: ICD-10-CM

## 2024-02-14 DIAGNOSIS — E78.1 HYPERTRIGLYCERIDEMIA: Primary | ICD-10-CM

## 2024-02-14 DIAGNOSIS — Z13.1 SCREENING FOR DIABETES MELLITUS: ICD-10-CM

## 2024-02-14 DIAGNOSIS — D75.839 THROMBOCYTOSIS: ICD-10-CM

## 2024-02-14 DIAGNOSIS — E66.3 OVERWEIGHT: ICD-10-CM

## 2024-02-14 DIAGNOSIS — D72.829 LEUKOCYTOSIS, UNSPECIFIED TYPE: ICD-10-CM

## 2024-02-14 PROCEDURE — 99214 OFFICE O/P EST MOD 30 MIN: CPT | Performed by: INTERNAL MEDICINE

## 2024-02-14 NOTE — ASSESSMENT & PLAN NOTE
Asymptomatic leukocytosis noted on blood work differential of the leukocytosis is normal patient denies any increased rate of infections.  Recommend continued surveillance

## 2024-02-14 NOTE — ASSESSMENT & PLAN NOTE
A trace of protein is noted in the urine analysis of this patient and reviewed with the patient does not have any history of hypertension or diabetes.  Recommend continued surveillance with follow-up in 1 yea.  Creatinine and GFR are in satisfactory range.

## 2024-02-14 NOTE — ASSESSMENT & PLAN NOTE
Importance of tobacco cessation reviewed with the patient today adverse effects of ongoing tobacco use include higher risk for heart attacks and strokes as well as higher risk of lung cancer and COPD.  Strategies to help him quit tobacco use reviewed

## 2024-02-14 NOTE — PROGRESS NOTES
Name: Kevin Dorantes      : 1975      MRN: [id number]  Encounter Provider: Sachin Valle MD  Encounter Date: 2024   Encounter department: [facility] INTERNAL MEDICINE    Assessment & Plan     1. Hypertriglyceridemia  Assessment & Plan:  Triglyceride values reviewed with the patient they are elevated and over 200.  I provided the patient with a patient education document today that guides him in the appropriate eating direction minimize consumption of evaluates and concentrated sugars.  Weight loss and regular exercise were also recommended.    Orders:  -     Lipid panel; Future; Expected date: 2025    2. Thrombocytosis  Assessment & Plan:  I reviewed the patient's CBC indicates mild thrombocytosis with a platelet count of 400,000.  He denies any history of blood clot issues given the fact that this is just a mild elevation I do not believe any intervention is necessary.  Recommend continued surveillance with follow-up test in 1 year    Orders:  -     CBC; Future; Expected date: 2025    3. Isolated proteinuria without specific morphologic lesion  Assessment & Plan:  A trace of protein is noted in the urine analysis of this patient and reviewed with the patient does not have any history of hypertension or diabetes.  Recommend continued surveillance with follow-up in 1 yea.  Creatinine and GFR are in satisfactory range.    Orders:  -     Urinalysis with microscopic; Future; Expected date: 2025    4. Leukocytosis, unspecified type  Assessment & Plan:  Asymptomatic leukocytosis noted on blood work differential of the leukocytosis is normal patient denies any increased rate of infections.  Recommend continued surveillance    Orders:  -     CBC; Future; Expected date: 2025    5. Screening for diabetes mellitus  -     Comprehensive metabolic panel; Future; Expected date: 2025    6. Screening for prostate cancer  -     PSA, Total Screen; Future; Expected date:  "01/01/2025    7. Overweight    8. Tobacco use  Assessment & Plan:  Importance of tobacco cessation reviewed with the patient today adverse effects of ongoing tobacco use include higher risk for heart attacks and strokes as well as higher risk of lung cancer and COPD.  Strategies to help him quit tobacco use reviewed             Subjective      This pleasant 48-year-old gentleman returns to our office today for review of his comprehensive blood work.  This was ordered after his first visit with us.  Patient reports no new complaints on today's visit.    We did discuss once again the importance of trying to quit tobacco use.      Review of Systems   All other systems reviewed and are negative.      Current Outpatient Medications on File Prior to Visit   Medication Sig    buPROPion (WELLBUTRIN SR) 200 MG 12 hr tablet     hydrOXYzine HCL (ATARAX) 25 mg tablet     propranolol (INDERAL LA) 60 mg 24 hr capsule     Rexulti 3 MG tablet        Objective     Pulse 76   Temp (!) 96.4 °F (35.8 °C)   Ht 5' 9\" (1.753 m)   Wt 88.9 kg (196 lb)   SpO2 98%   BMI 28.94 kg/m²     Physical Exam  Sachin Valle MD    "

## 2024-02-14 NOTE — ASSESSMENT & PLAN NOTE
I reviewed the patient's CBC indicates mild thrombocytosis with a platelet count of 400,000.  He denies any history of blood clot issues given the fact that this is just a mild elevation I do not believe any intervention is necessary.  Recommend continued surveillance with follow-up test in 1 year

## 2024-02-14 NOTE — ASSESSMENT & PLAN NOTE
Triglyceride values reviewed with the patient they are elevated and over 200.  I provided the patient with a patient education document today that guides him in the appropriate eating direction minimize consumption of evaluates and concentrated sugars.  Weight loss and regular exercise were also recommended.

## 2024-02-21 PROBLEM — Z00.00 HEALTH MAINTENANCE EXAMINATION: Status: RESOLVED | Noted: 2024-01-11 | Resolved: 2024-02-21

## 2024-05-10 ENCOUNTER — VBI (OUTPATIENT)
Dept: ADMINISTRATIVE | Facility: OTHER | Age: 49
End: 2024-05-10

## 2025-01-14 ENCOUNTER — RA CDI HCC (OUTPATIENT)
Dept: OTHER | Facility: HOSPITAL | Age: 50
End: 2025-01-14

## 2025-01-15 ENCOUNTER — OFFICE VISIT (OUTPATIENT)
Age: 50
End: 2025-01-15
Payer: COMMERCIAL

## 2025-01-15 VITALS
SYSTOLIC BLOOD PRESSURE: 126 MMHG | DIASTOLIC BLOOD PRESSURE: 70 MMHG | BODY MASS INDEX: 29.83 KG/M2 | TEMPERATURE: 97.4 F | HEIGHT: 69 IN | WEIGHT: 201.4 LBS | OXYGEN SATURATION: 96 % | HEART RATE: 85 BPM

## 2025-01-15 DIAGNOSIS — D75.839 THROMBOCYTOSIS: ICD-10-CM

## 2025-01-15 DIAGNOSIS — E78.1 HYPERTRIGLYCERIDEMIA: ICD-10-CM

## 2025-01-15 DIAGNOSIS — Z00.00 HEALTH MAINTENANCE EXAMINATION: Primary | ICD-10-CM

## 2025-01-15 DIAGNOSIS — E78.49 OTHER HYPERLIPIDEMIA: ICD-10-CM

## 2025-01-15 DIAGNOSIS — Z12.5 SCREENING FOR PROSTATE CANCER: ICD-10-CM

## 2025-01-15 DIAGNOSIS — D72.829 LEUKOCYTOSIS, UNSPECIFIED TYPE: ICD-10-CM

## 2025-01-15 DIAGNOSIS — Z13.1 SCREENING FOR DIABETES MELLITUS: ICD-10-CM

## 2025-01-15 PROBLEM — L98.9 SKIN LESION: Status: RESOLVED | Noted: 2024-01-11 | Resolved: 2025-01-15

## 2025-01-15 PROCEDURE — 99396 PREV VISIT EST AGE 40-64: CPT | Performed by: INTERNAL MEDICINE

## 2025-01-15 NOTE — ASSESSMENT & PLAN NOTE
On today's examination of the patient we find him to be in good general health.  He has gained approximately 5 pounds since last year I have encouraged him to work on reducing carbohydrate and concentrated sugar in his diet along with regular aerobic exercise.  The patient decided recently to discontinue his antianxiety antipsychotic and antidepressant medications including hydroxyzine Wellbutrin and Rexulti.  He evidently just did this abruptly and indicates he feels okay off the medications.  I did express some concern about the abrupt withdrawal or discontinuation of these medications.  I would prefer in the future that he consult physician prior to significant change in medication.

## 2025-01-15 NOTE — ASSESSMENT & PLAN NOTE
Low concentrated sugar and carbohydrate diet recommended follow-up triglyceride panel requested

## 2025-01-15 NOTE — ASSESSMENT & PLAN NOTE
Follow-up CBC has been requested no evidence of abnormal bleeding or clotting noted on examination today    Orders:    CBC and differential

## 2025-01-15 NOTE — PROGRESS NOTES
Name: Kevin Dorantes      : 1975      MRN: [id number]  Encounter Provider: Sachin Valle MD  Encounter Date: 1/15/2025   Encounter department: [facility] INTERNAL MEDICINE    Assessment & Plan  Hypertriglyceridemia  Low concentrated sugar and carbohydrate diet recommended follow-up triglyceride panel requested         Thrombocytosis  Follow-up CBC has been requested no evidence of abnormal bleeding or clotting noted on examination today    Orders:    CBC and differential    Leukocytosis, unspecified type  No abnormal frequency of infections follow-up CBC has been requested         Screening for diabetes mellitus    Orders:    Comprehensive metabolic panel    Screening for prostate cancer    Orders:    PSA, Total Screen    Other hyperlipidemia    Orders:    Lipid panel    Health maintenance examination  On today's examination of the patient we find him to be in good general health.  He has gained approximately 5 pounds since last year I have encouraged him to work on reducing carbohydrate and concentrated sugar in his diet along with regular aerobic exercise.  The patient decided recently to discontinue his antianxiety antipsychotic and antidepressant medications including hydroxyzine Wellbutrin and Rexulti.  He evidently just did this abruptly and indicates he feels okay off the medications.  I did express some concern about the abrupt withdrawal or discontinuation of these medications.  I would prefer in the future that he consult physician prior to significant change in medication.              History of Present Illness     This pleasant 49-year-old gentleman returns to our office today for annual physical examination.  Patient indicates that recently he decided to discontinue his Wellbutrin hydroxyzine propranolol and Rexulti.  He did this on his own.  Indicates that he does not feel bad since the discontinuation of these medications several weeks ago.      Review of Systems   All  "other systems reviewed and are negative.    Past Medical History:   Diagnosis Date    History of exposure to HIV     resolved 6/5/15     No past surgical history on file.  No family history on file.  Social History     Tobacco Use    Smoking status: Some Days    Smokeless tobacco: Never   Vaping Use    Vaping status: Never Used   Substance and Sexual Activity    Alcohol use: Yes    Drug use: No    Sexual activity: Not on file     Current Outpatient Medications on File Prior to Visit   Medication Sig    [DISCONTINUED] buPROPion (WELLBUTRIN SR) 200 MG 12 hr tablet     [DISCONTINUED] hydrOXYzine HCL (ATARAX) 25 mg tablet     [DISCONTINUED] propranolol (INDERAL LA) 60 mg 24 hr capsule     [DISCONTINUED] Rexulti 3 MG tablet      No Known Allergies  Immunization History   Administered Date(s) Administered    Anthrax 12/18/2002, 01/03/2003, 01/17/2003, 02/25/2004    COVID-19 J&J (Bee Shield) vaccine 0.5 mL 06/03/2021, 12/16/2021    Hep A, adult 06/11/1999, 09/09/2000    Hep B, adult 12/18/2002, 11/07/2003, 03/26/2004    IPV 06/11/1999, 11/20/2002    Influenza Quadrivalent Preservative Free 3 years and older IM 12/19/2014    Influenza Split 03/19/2001, 11/07/2003, 11/21/2006, 11/01/2007    Influenza, seasonal, injectable 01/11/2013    MMR 04/30/1999    Meningococcal MCV4P 01/07/2008    Meningococcal Polysaccharide (MPSV4) 04/30/1999, 10/30/1999    OPV 06/11/1999, 06/11/2000    Smallpox 01/24/2003    TD (adult) Preservative Free 05/16/2015    Td (adult), adsorbed 04/30/1999, 01/07/2008    Tdap 01/07/2008    Typhoid, Unspecified 09/09/2000, 12/18/2002    Typhoid, ViCPs 02/01/2005, 03/21/2007    Yellow Fever 06/11/1999     Objective   /70   Pulse 85   Temp (!) 97.4 °F (36.3 °C) (Tympanic)   Ht 5' 9\" (1.753 m)   Wt 91.4 kg (201 lb 6.4 oz)   SpO2 96%   BMI 29.74 kg/m²     Physical Exam  Vitals and nursing note reviewed.   Constitutional:       General: He is not in acute distress.     Appearance: He is well-developed. "   HENT:      Head: Normocephalic and atraumatic.      Right Ear: Tympanic membrane, ear canal and external ear normal.      Left Ear: Tympanic membrane, ear canal and external ear normal.      Nose: Nose normal.      Mouth/Throat:      Mouth: Mucous membranes are moist.      Pharynx: Oropharynx is clear.   Eyes:      Conjunctiva/sclera: Conjunctivae normal.      Pupils: Pupils are equal, round, and reactive to light.   Neck:      Vascular: No carotid bruit.   Cardiovascular:      Rate and Rhythm: Normal rate and regular rhythm.      Heart sounds: Normal heart sounds. No murmur heard.  Pulmonary:      Effort: Pulmonary effort is normal. No respiratory distress.      Breath sounds: Normal breath sounds. No wheezing, rhonchi or rales.   Abdominal:      General: Abdomen is flat. Bowel sounds are normal. There is no distension.      Palpations: Abdomen is soft. There is no mass.      Tenderness: There is no abdominal tenderness. There is no guarding.      Hernia: There is no hernia in the left inguinal area or right inguinal area.   Genitourinary:     Penis: Normal.       Testes: Normal.      Epididymis:      Right: Normal.      Left: Normal.      Prostate: Normal.      Rectum: Normal.   Musculoskeletal:         General: No swelling.      Cervical back: Normal range of motion and neck supple. No rigidity or tenderness.      Right lower leg: No edema.      Left lower leg: No edema.   Lymphadenopathy:      Cervical: No cervical adenopathy.      Lower Body: No right inguinal adenopathy. No left inguinal adenopathy.   Skin:     General: Skin is warm and dry.      Capillary Refill: Capillary refill takes less than 2 seconds.   Neurological:      General: No focal deficit present.      Mental Status: He is alert. Mental status is at baseline.   Psychiatric:         Mood and Affect: Mood normal.         Behavior: Behavior normal.         Thought Content: Thought content normal.         Judgment: Judgment normal.